# Patient Record
Sex: FEMALE | Employment: FULL TIME | URBAN - METROPOLITAN AREA
[De-identification: names, ages, dates, MRNs, and addresses within clinical notes are randomized per-mention and may not be internally consistent; named-entity substitution may affect disease eponyms.]

---

## 2021-05-29 ENCOUNTER — RECORDS - HEALTHEAST (OUTPATIENT)
Dept: ADMINISTRATIVE | Facility: CLINIC | Age: 56
End: 2021-05-29

## 2021-09-10 ENCOUNTER — HOSPITAL ENCOUNTER (EMERGENCY)
Facility: CLINIC | Age: 56
Discharge: HOME | End: 2021-09-10
Attending: EMERGENCY MEDICINE | Admitting: EMERGENCY MEDICINE
Payer: COMMERCIAL

## 2021-09-10 VITALS
DIASTOLIC BLOOD PRESSURE: 75 MMHG | RESPIRATION RATE: 18 BRPM | TEMPERATURE: 98.4 F | WEIGHT: 197 LBS | BODY MASS INDEX: 34.91 KG/M2 | HEART RATE: 65 BPM | SYSTOLIC BLOOD PRESSURE: 115 MMHG | HEIGHT: 63 IN | OXYGEN SATURATION: 99 %

## 2021-09-10 DIAGNOSIS — K62.5 RECTAL BLEEDING: ICD-10-CM

## 2021-09-10 LAB
ALBUMIN UR-MCNC: NEGATIVE MG/DL
ANION GAP SERPL CALCULATED.3IONS-SCNC: 11 MMOL/L (ref 5–18)
APPEARANCE UR: CLEAR
APTT PPP: 30 SECONDS (ref 22–38)
BILIRUB UR QL STRIP: NEGATIVE
BUN SERPL-MCNC: 15 MG/DL (ref 8–22)
CALCIUM SERPL-MCNC: 9 MG/DL (ref 8.5–10.5)
CHLORIDE BLD-SCNC: 107 MMOL/L (ref 98–107)
CO2 SERPL-SCNC: 23 MMOL/L (ref 22–31)
COLOR UR AUTO: ABNORMAL
CREAT SERPL-MCNC: 0.76 MG/DL (ref 0.6–1.1)
ERYTHROCYTE [DISTWIDTH] IN BLOOD BY AUTOMATED COUNT: 13.3 % (ref 10–15)
GFR SERPL CREATININE-BSD FRML MDRD: 89 ML/MIN/1.73M2
GLUCOSE BLD-MCNC: 100 MG/DL (ref 70–125)
GLUCOSE UR STRIP-MCNC: NEGATIVE MG/DL
HCT VFR BLD AUTO: 35.2 % (ref 35–47)
HGB BLD-MCNC: 11.7 G/DL (ref 11.7–15.7)
HGB UR QL STRIP: NEGATIVE
INR PPP: 0.96 (ref 0.85–1.15)
KETONES UR STRIP-MCNC: NEGATIVE MG/DL
LEUKOCYTE ESTERASE UR QL STRIP: NEGATIVE
MCH RBC QN AUTO: 29 PG (ref 26.5–33)
MCHC RBC AUTO-ENTMCNC: 33.2 G/DL (ref 31.5–36.5)
MCV RBC AUTO: 87 FL (ref 78–100)
MUCOUS THREADS #/AREA URNS LPF: PRESENT /LPF
NITRATE UR QL: NEGATIVE
PH UR STRIP: 6 [PH] (ref 5–7)
PLATELET # BLD AUTO: 343 10E3/UL (ref 150–450)
POTASSIUM BLD-SCNC: 3.7 MMOL/L (ref 3.5–5)
RBC # BLD AUTO: 4.03 10E6/UL (ref 3.8–5.2)
RBC URINE: <1 /HPF
SODIUM SERPL-SCNC: 141 MMOL/L (ref 136–145)
SP GR UR STRIP: 1.03 (ref 1–1.03)
SQUAMOUS EPITHELIAL: 4 /HPF
UROBILINOGEN UR STRIP-MCNC: <2 MG/DL
WBC # BLD AUTO: 6.9 10E3/UL (ref 4–11)
WBC URINE: 2 /HPF

## 2021-09-10 PROCEDURE — 80048 BASIC METABOLIC PNL TOTAL CA: CPT | Performed by: EMERGENCY MEDICINE

## 2021-09-10 PROCEDURE — 258N000003 HC RX IP 258 OP 636: Performed by: EMERGENCY MEDICINE

## 2021-09-10 PROCEDURE — 87591 N.GONORRHOEAE DNA AMP PROB: CPT | Performed by: EMERGENCY MEDICINE

## 2021-09-10 PROCEDURE — 85027 COMPLETE CBC AUTOMATED: CPT | Performed by: EMERGENCY MEDICINE

## 2021-09-10 PROCEDURE — 99283 EMERGENCY DEPT VISIT LOW MDM: CPT | Mod: 25

## 2021-09-10 PROCEDURE — 85610 PROTHROMBIN TIME: CPT | Performed by: EMERGENCY MEDICINE

## 2021-09-10 PROCEDURE — 85730 THROMBOPLASTIN TIME PARTIAL: CPT | Performed by: EMERGENCY MEDICINE

## 2021-09-10 PROCEDURE — 87491 CHLMYD TRACH DNA AMP PROBE: CPT | Performed by: EMERGENCY MEDICINE

## 2021-09-10 PROCEDURE — 81001 URINALYSIS AUTO W/SCOPE: CPT | Performed by: EMERGENCY MEDICINE

## 2021-09-10 PROCEDURE — 36415 COLL VENOUS BLD VENIPUNCTURE: CPT | Performed by: EMERGENCY MEDICINE

## 2021-09-10 PROCEDURE — 96360 HYDRATION IV INFUSION INIT: CPT

## 2021-09-10 RX ADMIN — SODIUM CHLORIDE 1000 ML: 9 INJECTION, SOLUTION INTRAVENOUS at 22:08

## 2021-09-10 ASSESSMENT — ENCOUNTER SYMPTOMS
TROUBLE SWALLOWING: 0
NAUSEA: 0
COUGH: 0
ABDOMINAL PAIN: 1
ANAL BLEEDING: 1
VOMITING: 0
DYSURIA: 0
CONSTIPATION: 0
FEVER: 0
DIARRHEA: 0
SHORTNESS OF BREATH: 0
BLOOD IN STOOL: 1

## 2021-09-10 ASSESSMENT — MIFFLIN-ST. JEOR: SCORE: 1462.72

## 2021-09-11 NOTE — ED PROVIDER NOTES
EMERGENCY DEPARTMENT ENCOUNTER      NAME: Patricia Marroquin  AGE: 56 year old female  YOB: 1965  MRN: [id number]  EVALUATION DATE & TIME: 9/10/2021  9:00 PM    PCP: No primary care provider on file.    ED PROVIDER: Jossy Sarmiento M.D.        Chief Complaint   Patient presents with     Rectal Bleeding         FINAL IMPRESSION:    1. Rectal bleeding            MEDICAL DECISION MAKIN year old female with history of bleeding internal hemorrhoids who presents emergency department with rectal bleeding for the past 3 weeks.  Saw primary care doctor last week and an anoscope was done and saw a bleeding internal hemorrhoid.  She was prescribed suppositories.  She has an appointment to see GI virtually on Monday.  She continues to have bleeding though it has not worsened.  Presents to the ER for reevaluation.  Hemoglobin at this time is overall stable.  Hemodynamically stable.  Discussed with patient about considering CT of the abdomen, though with visualized bleeding internal hemorrhoid I think this is less likely needed.  Patient agrees and does not want to do CT.  Has an appointment to see GI in 3 days.  At that time they can discuss with her about whether or not they think she needs any scoping and intervention.  At this time I feel patient is hemodynamically stable and able to be discharged home for outpatient follow-up with GI on Monday.    Discussed with patient what to watch for and when to return to the ER and all of her questions have been answered.    Incidentally patient denies any urinary symptoms or vaginal symptoms but would like to be checked for STDs.  Last sexually active 6 months ago.  Urine GC and chlamydia have been sent and results pending.          ED COURSE:  9:50 PM    I met with the patient to gather history and perform my exam. ED course and treatment discussed. Last hemoglobin to care everywhere was 12.9 from 2021.    10:43 PM  I updated the patient on lab  results. We discussed plans for discharge including supportive cares, symptomatic treatment, outpatient follow up, and reasons to return to the emergency department.      I do not think that this represents ACS, PE, ruptured AAA, aortic dissection, bowel obstruction, bowel ischemia, cholecystitis, pancreatitis, appendicitis, diverticulitis, kidney stone, pyelonephritis, incarcerated or strangulated hernia, ovarian torsion, PID, ectopic pregnancy, tubo-ovarian abscess, viscus perforation, perforated GI ulcer, or other such etiologies at this time.      COVID-19 PPE worn during patient evaluation:  Mask: n95 and homemade masks   Eye Protection: goggles  Gown: none  Hair cover: yes  Face shield: none  Patient wearing a mask: yes    At the conclusion of the encounter I discussed the results of all of the tests and the disposition. Their questions were answered. The patient (and any family present) acknowledged understanding and were agreeable with the care plan.          CONSULTANTS:  none          MEDICATIONS GIVEN IN THE EMERGENCY:  Medications   0.9% sodium chloride BOLUS (0 mLs Intravenous Stopped 9/10/21 8327)           NEW PRESCRIPTIONS STARTED AT TODAY'S ER VISIT     Medication List      There are no discharge medications for this visit.             CONDITION:  stable      DISPOSITION:  discharge home         =================================================================  =================================================================    HPI    Patient information was obtained from: patient     Use of Intrepreter: N/A     Patricia Marroquin is a 56 year old female with history of anxiety, chronic neck pain, internal hemorrhoids and pituitary adenoma who presents to the ER with complaints of rectal bleeding.    Per chart review, patient was seen at [facility] on 2/5 for evaluation of hematochezia. Patient explained that she had intermittent bright red blood per rectum for the last 1+ year. In January  2019, she underwent a diagnostic colonoscopy for the indication of hematochezia, with good prep quality, performed by Dr. Nathan, revealing internal hemorrhoids and hyperplastic polyp of the sigmoid colon, with recommendations to repeat in 5 years in lieu of a distant family member (aunt it) with colorectal cancer at the age of 49. Patient was told this was likely due to internal hemorrhoids and should start a high fiber diet, hemorrhoid cream, and repeat colonoscopy and recommended time.     For the past 3 weeks, the patient has had intermittent rectal bleeding and abdominal cramping with bowel movements. She saw her PCP a week ago and had an anal scope performed. There was a bleeding internal hemorrhoid and she was prescribed suppositories. She has had this bleeding for awhile now but it is not worse today. On 9/13 she has a virtual appointment with GI but felt she needed to be seen before the appointment. Denies fever, cough, chest pain, shortness of breath, vomiting, diarrhea, and any other complaints.    Patient is wanting STD testing but is not having any symptoms and hasn't been sexually active for 6 months.    REVIEW OF SYSTEMS  Review of Systems   Constitutional: Negative for fever.   HENT: Negative for trouble swallowing.    Respiratory: Negative for cough and shortness of breath.    Cardiovascular: Negative for chest pain.   Gastrointestinal: Positive for abdominal pain (intermittent cramping), anal bleeding and blood in stool. Negative for constipation, diarrhea, nausea and vomiting.   Genitourinary: Negative for dysuria.   Allergic/Immunologic: Negative for immunocompromised state.   All other systems reviewed and are negative.          PAST MEDICAL HISTORY:  Past Medical History:   Diagnosis Date     Internal hemorrhoids          PAST SURGICAL HISTORY:  History reviewed. No pertinent surgical history.      CURRENT MEDICATIONS:    Prior to Admission medications    Not on File         ALLERGIES:  No  "Known Allergies      FAMILY HISTORY:  History reviewed. No pertinent family history.      SOCIAL HISTORY:  Social History     Socioeconomic History     Marital status:      Spouse name: Not on file     Number of children: Not on file     Years of education: Not on file     Highest education level: Not on file   Occupational History     Not on file   Tobacco Use     Smoking status: Never Smoker     Smokeless tobacco: Never Used   Substance and Sexual Activity     Alcohol use: No     Drug use: Not on file     Sexual activity: Not on file   Other Topics Concern     Not on file   Social History Narrative    Works in school district. During the summer, works as part-time PCA and an  in clinic.      Social Determinants of Health     Financial Resource Strain:      Difficulty of Paying Living Expenses:    Food Insecurity:      Worried About Running Out of Food in the Last Year:      Ran Out of Food in the Last Year:    Transportation Needs:      Lack of Transportation (Medical):      Lack of Transportation (Non-Medical):    Physical Activity:      Days of Exercise per Week:      Minutes of Exercise per Session:    Stress:      Feeling of Stress :    Social Connections:      Frequency of Communication with Friends and Family:      Frequency of Social Gatherings with Friends and Family:      Attends Restorationism Services:      Active Member of Clubs or Organizations:      Attends Club or Organization Meetings:      Marital Status:    Intimate Partner Violence:      Fear of Current or Ex-Partner:      Emotionally Abused:      Physically Abused:      Sexually Abused:          VITALS:  Patient Vitals for the past 24 hrs:   BP Temp Temp src Pulse Resp SpO2 Height Weight   09/10/21 2210 100/59 -- -- 63 -- 96 % -- --   09/10/21 2057 (!) 142/81 98.4  F (36.9  C) Oral 81 18 97 % 1.6 m (5' 3\") 89.4 kg (197 lb)       Wt Readings from Last 3 Encounters:   09/10/21 89.4 kg (197 lb)         PHYSICAL EXAM  "   Constitutional:  Well developed, Well nourished, NAD, GCS 15  HENT:  Normocephalic, Atraumatic, Bilateral external ears normal, Nose normal. Neck- Normal range of motion, No tenderness, Supple, No stridor.   Eyes:  PERRL, EOMI, Conjunctiva normal, No discharge.  Respiratory:  Normal breath sounds, No respiratory distress, No wheezing, Speaks full sentences easily. No cough.  Cardiovascular:  Normal heart rate, Regular rhythm, No murmurs, No rubs, No gallops. Chest wall nontender.   GI:  No excessive obesity.  Bowel sounds normal, Soft, No tenderness, No masses, No flank tenderness. No rebound or guarding.   : deferred  Musculoskeletal:  No cyanosis, No clubbing. Good range of motion in all major joints. No tenderness to palpation or major deformities noted.   Integument:  Warm, Dry, No erythema, No rash.  No petechiae.   Neurologic:  Alert & oriented x 3, No focal deficits noted.   Psychiatric:  Affect normal, Cooperative          LAB:  All pertinent labs reviewed and interpreted.  Recent Results (from the past 24 hour(s))   CBC (+ platelets, no diff)    Collection Time: 09/10/21  9:12 PM   Result Value Ref Range    WBC Count 6.9 4.0 - 11.0 10e3/uL    RBC Count 4.03 3.80 - 5.20 10e6/uL    Hemoglobin 11.7 11.7 - 15.7 g/dL    Hematocrit 35.2 35.0 - 47.0 %    MCV 87 78 - 100 fL    MCH 29.0 26.5 - 33.0 pg    MCHC 33.2 31.5 - 36.5 g/dL    RDW 13.3 10.0 - 15.0 %    Platelet Count 343 150 - 450 10e3/uL   Basic metabolic panel    Collection Time: 09/10/21  9:12 PM   Result Value Ref Range    Sodium 141 136 - 145 mmol/L    Potassium 3.7 3.5 - 5.0 mmol/L    Chloride 107 98 - 107 mmol/L    Carbon Dioxide (CO2) 23 22 - 31 mmol/L    Anion Gap 11 5 - 18 mmol/L    Urea Nitrogen 15 8 - 22 mg/dL    Creatinine 0.76 0.60 - 1.10 mg/dL    Calcium 9.0 8.5 - 10.5 mg/dL    Glucose 100 70 - 125 mg/dL    GFR Estimate 89 >60 mL/min/1.73m2   INR    Collection Time: 09/10/21  9:12 PM   Result Value Ref Range    INR 0.96 0.85 - 1.15   PTT     Collection Time: 09/10/21  9:12 PM   Result Value Ref Range    aPTT 30 22 - 38 Seconds   UA with Microscopic reflex to Culture    Collection Time: 09/10/21 10:08 PM    Specimen: Urine, Clean Catch   Result Value Ref Range    Color Urine Light Yellow Colorless, Straw, Light Yellow, Yellow    Appearance Urine Clear Clear    Glucose Urine Negative Negative mg/dL    Bilirubin Urine Negative Negative    Ketones Urine Negative Negative mg/dL    Specific Gravity Urine 1.026 1.001 - 1.030    Blood Urine Negative Negative    pH Urine 6.0 5.0 - 7.0    Protein Albumin Urine Negative Negative mg/dL    Urobilinogen Urine <2.0 <2.0 mg/dL    Nitrite Urine Negative Negative    Leukocyte Esterase Urine Negative Negative    Mucus Urine Present (A) None Seen /LPF    RBC Urine <1 <=2 /HPF    WBC Urine 2 <=5 /HPF    Squamous Epithelials Urine 4 (H) <=1 /HPF       [facility] found [facility]: [facility]        RADIOLOGY:  Reviewed all pertinent imaging. Please see official radiology report.    No orders to display         EKG:    none    PROCEDURES:  none      I, Neelam Adams, am serving as a scribe to document services personally performed by Dr. Jossy Sarmiento based on my observation and the provider's statements to me. I, Dr. Jossy Sarmiento MD attest that Neelam Adams is acting in a scribe capacity, has observed my performance of the services and has documented them in accordance with my direction.        Jossy Sarmiento M.D. [facility]  Emergency Medicine and Medical Toxicology  [facility] EMERGENCY ROOM  [address] [id number] [phone]  Dept: [phone]           Jossy Sarmiento MD  09/10/21 3038

## 2021-09-11 NOTE — ED TRIAGE NOTES
Pt reports 3 week history of rectal bleeding. States she has a bm multiple times every day and has blood in her stool every time. Also reports blood with passing flatus. Reports being tired but not dizzy. C/o headaches. Pt also asking about a STD test

## 2021-09-11 NOTE — DISCHARGE INSTRUCTIONS
Continue to eat a soft diet high in fiber.  Keep your appointment to see the GI specialist for Monday.  They will help you determine if you need a colonoscopy or other procedure for this.    Your urine was sent to look for infection and STDs. We will call you in about 4 days if your STDs come back positive.    Return to the emergency department with worse bleeding, worse abdominal pain, fever, or any other concerns.    Thank you for choosing [facility] Emergency Department.  It has been my pleasure caring for you today.     ~Dr. Guillermina MD

## 2021-09-12 LAB
C TRACH DNA SPEC QL NAA+PROBE: NEGATIVE
N GONORRHOEA DNA SPEC QL NAA+PROBE: NEGATIVE